# Patient Record
(demographics unavailable — no encounter records)

---

## 2025-03-25 NOTE — [facility]
CLINICAL INDICATION:    left thumb pain s/p fall



TECHNIQUE:   XY L HAND 3V XRAY



Comparison: None



FINDINGS / IMPRESSION: 



No osseous or joint abnormality identified with no fracture or dislocation.



Electronically Signed by: Keyur Blankenship at 03/25/2025 23:56:26 PM

## 2025-03-25 NOTE — ED.PDOC
Musculoskeletal


HPI Comments


7-year-old male presents to ER with complaints of left hand pain x1 day.  

Patient is present with mother, reporting that he fell out of the trunk of her 

parked SUV and landed on his left hand at 6:30 p.m. prior to arrival to ER and 

has since been experiencing 10/10 pain localized to left thumb.  Denies head 

injury/LOC.  Denies use of medications for current symptoms.  Patient presents 

to ER ambulatory on arrival, with steady gait, in no distress.  Denies 

numbness/tingling, skin changes, wrist pain or any further symptoms/complaints


Chief Complaint:  Upper Extremity


Time Seen by MD:  19:18


Primary Care Provider:  UNKNOWN


Reviewed Notes:  Nurses Notes, Medications, Allergies


Allergies:  


Coded Allergies:  


     NO KNOWN ALLERGIES (Unverified , 19)


Home Meds


Active Scripts


Ibuprofen (Ibuprofen Childrens) 100 Mg/5 Ml Harriet, 12 ML PO Q6HPRN, #120 ML 0 

Refills


   Prov:RIDDHI VEE         3/25/25


Information Source:  Patient, Relative (Mother)


Mode of Arrival:  Ambulatory





Past Medical History


Immunizations:  Current


Medical History:  Denies





Family History


Family History:  Unknown





Social History


Smoking:  Non-Smoker


Alcohol:  Denies ETOH Use


Drugs:  Denies Drug Use


Lives In:  Home





Constitutional:  denies: chills, diaphoresis, fatigue, fever, malaise, sweats, 

weakness, others


EENTM:  denies: blurred vision, double vision, ear bleeding, ear discharge, ear 

drainage, ear pain, ear ringing, eye pain, eye redness, hearing loss, mouth 

pain, mouth swelling, nasal discharge, nose bleeding, nose congestion, nose 

pain, photophobia, tearing, throat pain, throat swelling, voice changes, others


Respiratory:  denies: cough, hemoptysis, orthopnea, SOB at rest, shortness of 

breath, SOB with excertion, stridor, wheezing, others


Cardiovascular:  denies: chest pain, dizzy spells, diaphoresis, Dyspnea on 

exertion, edema, irregular heart beat, left arm pain, lightheadedness, 

palpitations, PND, syncope, others


Gastrointestinal:  denies: abdomen distended, abdominal pain, blood streaked 

bowels, constipated, diarrhea, dysphagia, difficulty swallowing, hematemesis, 

melena, nausea, poor appetite, poor fluid intake, rectal bleeding, rectal pain, 

vomiting, others


Genitourinary:  denies: burning, dysuria, flank pain, frequency, hematuria, 

incontinence, penile discharge, penile sore, pain, testicle pain, testicle 

swelling, urgency, others


Neurological:  denies: dizziness, fainting, headache, left sided numbness, left 

sided weakness, numbness, paresthesia, pre-existing deficit, right sided 

numbness, right sided weakness, seizure, speech problems, tingling, tremors, 

weakness, others


Musculoskeletal:  reports: others (As stated in HPI)


Integumetry:  denies: bruises, change in color, change in hair/nails, dryness, 

laceration, lesions, lumps, rash, wounds, others


Allergic/Immunocompromised:  denies: Difficulty Healing, Frequent Infections, 

Hives, Itching, others


Hematologic/Lymphatic:  denies: anemia, blood clots, easy bleeding, easy 

bruising, swollen glands, others


Endocrine:  denies: excessive hunger, excessive sweating, excessive thirst, 

excessive urination, flushing, intolerance to cold, intolerance to heat, 

unexplained weight gain, unexplained weight loss, others


Psychiatric:  denies: anxiety, bipolar disorder, depression, hopeless, panic 

disorder, schizophrenia, sleepless, suicidal, others





Physical Exam


General Appearance:  No Apparent Distress


HEENT:  PERRL/EOMI


Neck:  Full Range of Motion, Non-Tender, Normal


Respiratory:  Chest Non-Tender, Lungs Clear, No Accessory Muscle Use, No 

Respiratory Distress, Normal Breath Sounds


Cardiovascular:  No Murmur, No Gallop, Regular Rate/Rhythm


Breast Exam:  Deferred


Gastrointestinal:  NOT DONE


Genitalia:  Deferred


Pelvic:  Deferred


Rectal:  Deferred


Extremities:  Normal capillary refill, Normal range of motion


Musculoskeletal :  


   Extremity Location:  Thumb (TTP/mild swelling to left thumb noted.  No TTP to

left wrist noted.  Pulses intact.  No deformiteis/further skin changes noted.  

Patient able to fully move all fingers left hand.)


Neurologic:  Alert, CNs II-XII nml as Tested, No Motor Deficits, Normal Affect, 

Normal Mood, No Sensory Deficits


Cerebellar Function:  Normal


Reflexes:  Normal


Skin:  Dry, Normal Color, Warm


Peripheral Pulses:  2+ Radial (R), 2+ Radial (L), 2+ Brachial (R), 2+ Brachial 

(L)


Lymphatic:  No Adenopathy





Was a procedure done?


Was a procedure done?:  No





Sedation


Sedation?:  No





Differential Diagnosis EXT


Differential Diagnosis:  Fracture, Dislocation, Neurovascular injury





X-Ray, Labs, Meds, VS





                                   Vital Signs








  Date Time  Temp Pulse Resp B/P (MAP) Pulse Ox O2 Delivery O2 Flow Rate FiO2


 


3/25/25 22:00 97.8 117 18 131/84 (100) 100   





 97.8       


 


3/25/25 21:31      Room Air 0 


 


3/25/25 19:17 97.8 117 18 131/84 (100) 100   





 97.8       








PATIENT: OUSMANE ONEIL   ACCT: [id number]        UNIT: [id number]


: 2017           LOC: ER                   ROOM / BED:  / 


AGE / SEX: 7 / M          ADM STATUS: REG ER        SERVICE DT: 25





ORDERING PHYSICIAN: RIDDHI VEE


PROCEDURE(s): LHAN - L HAND 3V XRAY


REASON: left thumb pain s/p fall


ORDER NUMBER(s): [id number], ACCESSION NUMBER(s): [id number]








CLINICAL INDICATION:    left thumb pain s/p fall





TECHNIQUE:   XY L HAND 3V XRAY





Comparison: None





FINDINGS / IMPRESSION: 





No osseous or joint abnormality identified with no fracture or dislocation.





Electronically Signed by: Keyur Ann at 2025 23:56:26 PM











DICTATED BY: KEYUR ANN MD


DICTATED DATE/TIME: 25





SIGNED BY: KEYUR ANN MD


SIGNED DATE/TIME: 25





CC: 











Left hand x-ray reviewed


Patient neurovascularly intact 


Left thumb spica splint applied 


Advised on rest/no strenuous activity, elevation and alternate ice on/off as 

needed for pain/swelling 


Advised on re-x-ray of left hand in one week if symptoms do not improve 


Advised to follow up with PCP in 1-2 days 


Patient's mother verbalized understanding and agreeable with current plan of 

care 


Advised to return to ER immediately if symptoms worsen


Images Reviewed?:  Images reviewed and evaluated by me


Time of 1ST Reevaluation:  21:12


Reevaluation 1ST:  N/A


Patient Education/Counseling:  Diagnosis, Other (patient 7 years old)


Family Education/Counseling:  Diagnosis, Treatment, Prognosis, Need For Follow 

Up





Departure 1


Departure


Time of Disposition:  21:32


Impression:  


   Primary Impression:  


   Sprain of left thumb


   Qualified Codes:  S63.602A - Unspecified sprain of left thumb, initial 

   encounter


Disposition:   HOME / SELF CARE / HOMELESS


Condition:  Stable


e-Prescriptions


Ibuprofen (Ibuprofen Childrens) 100 Mg/5 Ml Harriet


12 ML PO Q6HPRN, #120 ML 0 Refills


   Prov: RIDDHI VEE         3/25/25


Discharged With:  Relative (Mother)





Critical Care Note


Critical Care Time?:  No





Stability


Stability form required:  RIDDHI Hand               Mar 25, 2025 21:36